# Patient Record
Sex: FEMALE | Race: WHITE | NOT HISPANIC OR LATINO | Employment: FULL TIME | URBAN - METROPOLITAN AREA
[De-identification: names, ages, dates, MRNs, and addresses within clinical notes are randomized per-mention and may not be internally consistent; named-entity substitution may affect disease eponyms.]

---

## 2017-01-03 ENCOUNTER — HOSPITAL ENCOUNTER (OUTPATIENT)
Dept: RADIOLOGY | Facility: MEDICAL CENTER | Age: 52
Discharge: HOME | End: 2017-01-03
Payer: COMMERCIAL

## 2017-01-03 DIAGNOSIS — Z12.31 OTHER SCREENING MAMMOGRAM: ICD-10-CM

## 2017-01-03 PROCEDURE — G0202 SCR MAMMO BI INCL CAD: HCPCS

## 2017-09-24 ENCOUNTER — OFFICE VISIT (OUTPATIENT)
Dept: URGENT CARE | Facility: MEDICAL CENTER | Age: 52
End: 2017-09-24
Payer: COMMERCIAL

## 2017-09-24 PROCEDURE — 99213 OFFICE O/P EST LOW 20 MIN: CPT

## 2017-12-28 ENCOUNTER — TRANSCRIBE ORDERS (OUTPATIENT)
Dept: ADMINISTRATIVE | Facility: HOSPITAL | Age: 52
End: 2017-12-28

## 2017-12-28 DIAGNOSIS — R92.2 DENSE BREASTS: ICD-10-CM

## 2017-12-28 DIAGNOSIS — Z12.31 VISIT FOR SCREENING MAMMOGRAM: Primary | ICD-10-CM

## 2019-02-22 ENCOUNTER — OFFICE VISIT (OUTPATIENT)
Dept: URGENT CARE | Facility: MEDICAL CENTER | Age: 54
End: 2019-02-22
Payer: COMMERCIAL

## 2019-02-22 VITALS
WEIGHT: 118.5 LBS | HEART RATE: 68 BPM | BODY MASS INDEX: 17.96 KG/M2 | DIASTOLIC BLOOD PRESSURE: 70 MMHG | SYSTOLIC BLOOD PRESSURE: 140 MMHG | HEIGHT: 68 IN | OXYGEN SATURATION: 100 % | TEMPERATURE: 98.6 F | RESPIRATION RATE: 16 BRPM

## 2019-02-22 DIAGNOSIS — F07.81 POST CONCUSSION SYNDROME: Primary | ICD-10-CM

## 2019-02-22 PROCEDURE — 99213 OFFICE O/P EST LOW 20 MIN: CPT | Performed by: FAMILY MEDICINE

## 2019-02-22 RX ORDER — MECLIZINE HCL 12.5 MG/1
12.5 TABLET ORAL EVERY 8 HOURS PRN
Qty: 20 TABLET | Refills: 0 | Status: SHIPPED | OUTPATIENT
Start: 2019-02-22

## 2019-02-22 NOTE — LETTER
February 22, 2019     Patient: Marce Gomez   YOB: 1965   Date of Visit: 2/22/2019       To Whom It May Concern:    Marce Gomez was evaluated here on 02/22/2019  Please excuse her absence from work  If you have any questions or concerns, please don't hesitate to call           Sincerely,        [address] [facility] [place]    CC: Marce Gomez

## 2019-02-22 NOTE — PROGRESS NOTES
[address] [facility]        NAME: Kenny Grady is a 48 y o  female  : 1965    MRN: [id number]  DATE: 2019  TIME: 8:41 AM    Assessment and Plan   Post concussion syndrome [F07 81]  1  Post concussion syndrome  meclizine (ANTIVERT) 12 5 MG tablet     Patient advised on a neurocognitive rest   Should avoid reading in looking aunt TV screens or computer screens  Meclizine prescribed as needed for vertigo  Patient Instructions     Follow up with PCP in 3-5 days  Proceed to  ER if symptoms worsen  Chief Complaint     Chief Complaint   Patient presents with    Fall     fall on wednesday hitting back of head on ground  saw "stars",did not loss consciousness  next day started with nausea  today nausea and dizziness  History of Present Illness     71-year-old female who presents today due to concussion like symptoms sustained from head trauma 3 days ago  Was walking down her driveway and slipped on ice falling backwards and hitting the back of her head  Denies any loss of consciousness but did see stars  Since then she reports having nausea, posterior head pain, mild blurriness of vision and vertigo  Denies any photophobia  Denies ever having concussion in the past   Does not take any medications that affect the central nervous system  Review of Systems   Review of Systems   Eyes: Positive for visual disturbance (mild blurriness)  Negative for photophobia  Gastrointestinal: Positive for nausea  Neurological: Positive for dizziness  Negative for headaches           Current Medications       Current Outpatient Medications:     meclizine (ANTIVERT) 12 5 MG tablet, Take 1 tablet (12 5 mg total) by mouth every 8 (eight) hours as needed for dizziness, Disp: 20 tablet, Rfl: 0    Current Allergies     Allergies as of 2019    (No Known Allergies)            The following portions of the patient's history were reviewed and updated as appropriate: allergies, current medications, past family history, past medical history, past social history, past surgical history and problem list      History reviewed  No pertinent past medical history  History reviewed  No pertinent surgical history  History reviewed  No pertinent family history  Medications have been verified  Objective   /70   Pulse 68   Temp 98 6 °F (37 °C) (Temporal)   Resp 16   Ht 5' 8" (1 727 m)   Wt 53 8 kg (118 lb 8 oz)   SpO2 100%   BMI 18 02 kg/m²        Physical Exam     Physical Exam   Constitutional: She is oriented to person, place, and time  She appears well-developed and well-nourished  No distress  HENT:   Head: Normocephalic and atraumatic  Eyes: Pupils are equal, round, and reactive to light  Conjunctivae are normal  Right eye exhibits no discharge  Left eye exhibits no discharge  Cardiovascular: Normal rate and regular rhythm  Pulmonary/Chest: Effort normal and breath sounds normal    Neurological: She is alert and oriented to person, place, and time  She displays abnormal reflex (patellar reflexes somewhat diminished, adequate bicep and tricep reflexes  )  No cranial nerve deficit  Coordination normal    Skin: Skin is warm  She is not diaphoretic  Psychiatric: She has a normal mood and affect  Her behavior is normal  Judgment and thought content normal    Vitals reviewed

## 2021-06-18 ENCOUNTER — APPOINTMENT (OUTPATIENT)
Dept: RADIOLOGY | Facility: MEDICAL CENTER | Age: 56
End: 2021-06-18
Payer: COMMERCIAL

## 2021-06-18 ENCOUNTER — OFFICE VISIT (OUTPATIENT)
Dept: URGENT CARE | Facility: MEDICAL CENTER | Age: 56
End: 2021-06-18
Payer: COMMERCIAL

## 2021-06-18 VITALS
HEIGHT: 68 IN | RESPIRATION RATE: 18 BRPM | WEIGHT: 118 LBS | HEART RATE: 60 BPM | OXYGEN SATURATION: 97 % | TEMPERATURE: 98.6 F | BODY MASS INDEX: 17.88 KG/M2

## 2021-06-18 DIAGNOSIS — M79.661 PAIN IN RIGHT LOWER LEG: Primary | ICD-10-CM

## 2021-06-18 DIAGNOSIS — M79.604 RIGHT LEG PAIN: ICD-10-CM

## 2021-06-18 PROCEDURE — 73590 X-RAY EXAM OF LOWER LEG: CPT

## 2021-06-18 PROCEDURE — 99213 OFFICE O/P EST LOW 20 MIN: CPT | Performed by: PHYSICIAN ASSISTANT

## 2021-06-18 NOTE — PROGRESS NOTES
[facility]        NAME: Marcus Sandoval is a 64 y o  female  : 1965    MRN: [id number]  DATE: 2021  TIME: 6:17 PM    Assessment and Plan   Pain in right lower leg [M79 [id number]]  1  Pain in right lower leg  XR tibia fibula 2 vw right     X-ray shows no acute abnormality  Likely due to overuse  Recommended rest, ice, compression and Tylenol or Motrin for pain  Patient Instructions     Tylenol or Motrin for pain   rest, ice, compression  Follow up with PCP in 3-5 days  Proceed to  ER if symptoms worsen  Chief Complaint     Chief Complaint   Patient presents with    Leg Pain     right lower leg pain x 2 days no injury noted          History of Present Illness         Patient is a 44-year-old female who comes in today with right shin pain for the past 2 days  Denies any injury  She is a  and does a lot of walking  There is no leg swelling or color change  Pain is mostly at night while she is asleep  No pain with weight-bearing  No numbness or tingling  Review of Systems   Review of Systems   Constitutional: Negative for fever  Musculoskeletal: Positive for myalgias  Negative for arthralgias and joint swelling  Skin: Negative for color change  Current Medications       Current Outpatient Medications:     meclizine (ANTIVERT) 12 5 MG tablet, Take 1 tablet (12 5 mg total) by mouth every 8 (eight) hours as needed for dizziness (Patient not taking: Reported on 2021), Disp: 20 tablet, Rfl: 0    Current Allergies     Allergies as of 2021    (No Known Allergies)            The following portions of the patient's history were reviewed and updated as appropriate: allergies, current medications, past family history, past medical history, past social history, past surgical history and problem list      History reviewed  No pertinent past medical history  History reviewed  No pertinent surgical history  History reviewed   No pertinent family history  Medications have been verified  Objective   Pulse 60   Temp 98 6 °F (37 °C)   Resp 18   Ht 5' 8" (1 727 m)   Wt 53 5 kg (118 lb)   SpO2 97%   BMI 17 94 kg/m²        Physical Exam     Physical Exam  Constitutional:       General: She is not in acute distress  Appearance: Normal appearance  She is not ill-appearing  Cardiovascular:      Rate and Rhythm: Normal rate and regular rhythm  Pulmonary:      Effort: Pulmonary effort is normal    Musculoskeletal:         General: Tenderness present  No swelling, deformity or signs of injury  Normal range of motion  Right lower leg: Tenderness and bony tenderness present  No swelling  No edema  Left lower leg: No edema  Legs:    Skin:     General: Skin is warm and dry  Findings: No bruising or erythema  Neurological:      Mental Status: She is alert

## 2024-02-18 ENCOUNTER — OFFICE VISIT (OUTPATIENT)
Dept: URGENT CARE | Facility: MEDICAL CENTER | Age: 59
End: 2024-02-18
Payer: COMMERCIAL

## 2024-02-18 VITALS
DIASTOLIC BLOOD PRESSURE: 86 MMHG | WEIGHT: 124 LBS | HEART RATE: 76 BPM | TEMPERATURE: 98 F | OXYGEN SATURATION: 99 % | SYSTOLIC BLOOD PRESSURE: 133 MMHG | BODY MASS INDEX: 19.46 KG/M2 | HEIGHT: 67 IN | RESPIRATION RATE: 18 BRPM

## 2024-02-18 DIAGNOSIS — R31.9 URINARY TRACT INFECTION WITH HEMATURIA, SITE UNSPECIFIED: Primary | ICD-10-CM

## 2024-02-18 DIAGNOSIS — N39.0 URINARY TRACT INFECTION WITH HEMATURIA, SITE UNSPECIFIED: Primary | ICD-10-CM

## 2024-02-18 LAB
SL AMB  POCT GLUCOSE, UA: ABNORMAL
SL AMB LEUKOCYTE ESTERASE,UA: ABNORMAL
SL AMB POCT BILIRUBIN,UA: ABNORMAL
SL AMB POCT BLOOD,UA: ABNORMAL
SL AMB POCT CLARITY,UA: ABNORMAL
SL AMB POCT COLOR,UA: YELLOW
SL AMB POCT KETONES,UA: ABNORMAL
SL AMB POCT NITRITE,UA: POSITIVE
SL AMB POCT PH,UA: 7.5
SL AMB POCT SPECIFIC GRAVITY,UA: 1
SL AMB POCT URINE PROTEIN: 0.2
SL AMB POCT UROBILINOGEN: 0

## 2024-02-18 PROCEDURE — 87077 CULTURE AEROBIC IDENTIFY: CPT | Performed by: PHYSICIAN ASSISTANT

## 2024-02-18 PROCEDURE — 87186 SC STD MICRODIL/AGAR DIL: CPT | Performed by: PHYSICIAN ASSISTANT

## 2024-02-18 PROCEDURE — 81002 URINALYSIS NONAUTO W/O SCOPE: CPT | Performed by: PHYSICIAN ASSISTANT

## 2024-02-18 PROCEDURE — 99213 OFFICE O/P EST LOW 20 MIN: CPT | Performed by: PHYSICIAN ASSISTANT

## 2024-02-18 PROCEDURE — 87086 URINE CULTURE/COLONY COUNT: CPT | Performed by: PHYSICIAN ASSISTANT

## 2024-02-18 RX ORDER — CALCIUM CARBONATE/VITAMIN D3 500MG-5MCG
TABLET ORAL
COMMUNITY

## 2024-02-18 RX ORDER — SULFAMETHOXAZOLE AND TRIMETHOPRIM 800; 160 MG/1; MG/1
1 TABLET ORAL EVERY 12 HOURS SCHEDULED
Qty: 14 TABLET | Refills: 0 | Status: SHIPPED | OUTPATIENT
Start: 2024-02-18 | End: 2024-02-25

## 2024-02-18 NOTE — PROGRESS NOTES
[facility]        NAME: Ginny Gilbert is a 58 y.o. female  : 1965    MRN: [id number]  DATE: 2024  TIME: 10:44 AM    Assessment and Plan   Urinary tract infection with hematuria, site unspecified [N39.0, R31.9]  1. Urinary tract infection with hematuria, site unspecified  Urine culture    POCT urine dip    Urine culture    sulfamethoxazole-trimethoprim (BACTRIM DS) 800-160 mg per tablet            Patient Instructions       Follow up with PCP in 3-5 days.  Proceed to  ER if symptoms worsen.    Chief Complaint     Chief Complaint   Patient presents with    Possible UTI     Pt. C/O urinary urgency and frequency for the past couple days          History of Present Illness       Patient is here for urinary frequency and pressure.  Her last UTI was a few years ago.        Review of Systems   Review of Systems   Constitutional: Negative.    Gastrointestinal: Negative.    Genitourinary:  Positive for dysuria, frequency and urgency. Negative for decreased urine volume, difficulty urinating, flank pain, hematuria, pelvic pain and vaginal pain.   Musculoskeletal: Negative.          Current Medications       Current Outpatient Medications:     Calcium Carb-Cholecalciferol (Calcium 500 + D) 500-5 MG-MCG TABS, Take by mouth, Disp: , Rfl:     sulfamethoxazole-trimethoprim (BACTRIM DS) 800-160 mg per tablet, Take 1 tablet by mouth every 12 (twelve) hours for 7 days, Disp: 14 tablet, Rfl: 0    meclizine (ANTIVERT) 12.5 MG tablet, Take 1 tablet (12.5 mg total) by mouth every 8 (eight) hours as needed for dizziness (Patient not taking: Reported on 2021), Disp: 20 tablet, Rfl: 0    Current Allergies     Allergies as of 2024    (No Known Allergies)            The following portions of the patient's history were reviewed and updated as appropriate: allergies, current medications, past family history, past medical history, past social history, past surgical history and problem list.     No past  "medical history on file.    No past surgical history on file.    No family history on file.      Medications have been verified.        Objective   /86   Pulse 76   Temp 98 °F (36.7 °C)   Resp 18   Ht 5' 7\" (1.702 m)   Wt 56.2 kg (124 lb)   SpO2 99%   BMI 19.42 kg/m²   No LMP recorded. Patient is postmenopausal.       Physical Exam     Physical Exam  Vitals and nursing note reviewed.   Constitutional:       General: She is not in acute distress.     Appearance: Normal appearance. She is well-developed. She is not ill-appearing, toxic-appearing or diaphoretic.   HENT:      Head: Normocephalic and atraumatic.   Abdominal:      General: Bowel sounds are normal. There is no distension.      Palpations: Abdomen is soft. There is no mass.      Tenderness: There is no abdominal tenderness. There is no right CVA tenderness, left CVA tenderness, guarding or rebound.   Skin:     General: Skin is warm and dry.      Findings: No rash.   Neurological:      Mental Status: She is alert and oriented to person, place, and time.   Psychiatric:         Behavior: Behavior normal.         Thought Content: Thought content normal.         Judgment: Judgment normal.                   "

## 2024-02-18 NOTE — PATIENT INSTRUCTIONS
1.  Drink plenty fluids.    2.  Take probiotics [i.e. Yogurt, Acidophilus, Florastor (liquid)] daily.    3.  Over-the-counter medications as needed for symptomatic care.    4.   Advance activities as tolerated.    5.   Follow-up with your primary care physician in 3-4 days.    6.  Go to emergency room if symptoms are worsening.

## 2024-02-20 LAB — BACTERIA UR CULT: ABNORMAL

## 2024-02-21 ENCOUNTER — TELEPHONE (OUTPATIENT)
Dept: URGENT CARE | Age: 59
End: 2024-02-21

## 2024-08-30 ENCOUNTER — OFFICE VISIT (OUTPATIENT)
Dept: URGENT CARE | Facility: MEDICAL CENTER | Age: 59
End: 2024-08-30
Payer: COMMERCIAL

## 2024-08-30 VITALS
TEMPERATURE: 98.6 F | HEART RATE: 58 BPM | RESPIRATION RATE: 20 BRPM | DIASTOLIC BLOOD PRESSURE: 75 MMHG | OXYGEN SATURATION: 100 % | SYSTOLIC BLOOD PRESSURE: 145 MMHG

## 2024-08-30 DIAGNOSIS — T63.484A ALLERGIC REACTION TO INSECT STING, UNDETERMINED INTENT, INITIAL ENCOUNTER: Primary | ICD-10-CM

## 2024-08-30 DIAGNOSIS — Z23 ENCOUNTER FOR IMMUNIZATION: ICD-10-CM

## 2024-08-30 DIAGNOSIS — S80.811A ABRASION OF RIGHT LOWER EXTREMITY, INITIAL ENCOUNTER: ICD-10-CM

## 2024-08-30 PROCEDURE — 90471 IMMUNIZATION ADMIN: CPT

## 2024-08-30 PROCEDURE — 99213 OFFICE O/P EST LOW 20 MIN: CPT

## 2024-08-30 PROCEDURE — 90715 TDAP VACCINE 7 YRS/> IM: CPT

## 2024-08-30 RX ORDER — TRIAMCINOLONE ACETONIDE 5 MG/G
CREAM TOPICAL 2 TIMES DAILY
Qty: 30 G | Refills: 0 | Status: SHIPPED | OUTPATIENT
Start: 2024-08-30

## 2024-08-30 NOTE — PROGRESS NOTES
[facility]        NAME: Ginny Gilbert is a 59 y.o. female  : 1965    MRN: [id number]  DATE: 2024  TIME: 11:34 AM    Assessment and Plan   Allergic reaction to insect sting, undetermined intent, initial encounter [T63.484A]  1. Allergic reaction to insect sting, undetermined intent, initial encounter  triamcinolone (KENALOG) 0.5 % cream      2. Abrasion of right lower extremity, initial encounter        3. Encounter for immunization  Tdap Vaccine greater than or equal to 8yo            Patient Instructions   Your TDap was updated today  The skin from the abrasion on your leg appears to be intact- you do not need to apply anything on it.  The area where you got stung has small amounts of swelling and itching - you can try low potency steroid cream like hydrocorisone 1% but if not effective after about 48 hours then can switch over to Kenalog cream that I had prescribed.    Follow up with Primary Care Provider in 3-5 days if not improving.  Proceed to Emergency Department if symptoms worsen.    If tests have been performed at [facility], our office will contact you with results if changes need to be made to the care plan discussed with you at the visit.  You can review your full results on [facility] My[facility].    Chief Complaint     Chief Complaint   Patient presents with    Immunizations     Need for tdap immunization; patient states she scrapped her right calf on dirty piece of metal off deck     Wound Check    Bee Sting     Patient was stung by yellow jacket yesterday afternoon          History of Present Illness       Was stung by a yellow jacket yesterday afternoon in the left calf and it has been itchy and bothering her sleep - has not applied any OTC medication or other interventions.  Has scrapped her right calf to a dirty metal part of her deck about 1 hour prior to arrival, she was checking her records and noticed her Tdap was not up to date and would like to be updated.        Review  of Systems   Review of Systems   Respiratory:  Negative for shortness of breath.    Cardiovascular:  Negative for chest pain.   Skin:  Positive for rash and wound.   Neurological:  Negative for dizziness, light-headedness and numbness.         Current Medications       Current Outpatient Medications:     triamcinolone (KENALOG) 0.5 % cream, Apply topically 2 (two) times a day To right calf where localized swelling is occurring., Disp: 30 g, Rfl: 0    Calcium Carb-Cholecalciferol (Calcium 500 + D) 500-5 MG-MCG TABS, Take by mouth, Disp: , Rfl:     meclizine (ANTIVERT) 12.5 MG tablet, Take 1 tablet (12.5 mg total) by mouth every 8 (eight) hours as needed for dizziness (Patient not taking: Reported on 6/18/2021), Disp: 20 tablet, Rfl: 0    Current Allergies     Allergies as of 08/30/2024    (No Known Allergies)            The following portions of the patient's history were reviewed and updated as appropriate: allergies, current medications, past family history, past medical history, past social history, past surgical history and problem list.     History reviewed. No pertinent past medical history.    History reviewed. No pertinent surgical history.    History reviewed. No pertinent family history.      Medications have been verified.        Objective   /75   Pulse 58   Temp 98.6 °F (37 °C) (Temporal)   Resp 20   SpO2 100%   No LMP recorded. Patient is postmenopausal.       Physical Exam     Physical Exam  Vitals and nursing note reviewed.   Constitutional:       Appearance: Normal appearance.   HENT:      Head: Normocephalic and atraumatic.   Pulmonary:      Effort: Pulmonary effort is normal.   Skin:     General: Skin is warm and dry.      Capillary Refill: Capillary refill takes less than 2 seconds.          Neurological:      General: No focal deficit present.      Mental Status: She is alert and oriented to person, place, and time. Mental status is at baseline.      Sensory: No sensory deficit.       Motor: No weakness.   Psychiatric:         Mood and Affect: Mood normal.         Behavior: Behavior normal.         Thought Content: Thought content normal.

## 2024-08-30 NOTE — PATIENT INSTRUCTIONS
Your TDap was updated today  The skin from the abrasion on your leg appears to be intact- you do not need to apply anything on it.  The area where you got stung has small amounts of swelling and itching - you can try low potency steroid cream like hydrocorisone 1% but if not effective after about 48 hours then can switch over to Kenalog cream that I had prescribed.    Follow up with Primary Care Provider in 3-5 days if not improving.  Proceed to Emergency Department if symptoms worsen.    If tests have been performed at [facility], our office will contact you with results if changes need to be made to the care plan discussed with you at the visit.  You can review your full results on [facility] My[facility].

## 2024-12-20 ENCOUNTER — APPOINTMENT (OUTPATIENT)
Dept: RADIOLOGY | Facility: MEDICAL CENTER | Age: 59
End: 2024-12-20
Payer: OTHER MISCELLANEOUS

## 2024-12-20 ENCOUNTER — OCCMED (OUTPATIENT)
Dept: URGENT CARE | Facility: MEDICAL CENTER | Age: 59
End: 2024-12-20
Payer: OTHER MISCELLANEOUS

## 2024-12-20 DIAGNOSIS — S99.922A INJURY OF LEFT ANKLE AND FOOT, INITIAL ENCOUNTER: ICD-10-CM

## 2024-12-20 DIAGNOSIS — S99.912A INJURY OF LEFT ANKLE, INITIAL ENCOUNTER: ICD-10-CM

## 2024-12-20 DIAGNOSIS — S99.912A INJURY OF LEFT ANKLE AND FOOT, INITIAL ENCOUNTER: ICD-10-CM

## 2024-12-20 DIAGNOSIS — S99.912A INJURY OF LEFT ANKLE, INITIAL ENCOUNTER: Primary | ICD-10-CM

## 2024-12-20 PROCEDURE — 73610 X-RAY EXAM OF ANKLE: CPT

## 2024-12-21 NOTE — PROGRESS NOTES
This encounter was created for [facility] orders only .   [facility]        NAME: Ginny Gilbert is a 59 y.o. female  : 1965    MRN: [id number]  DATE: 2024  TIME: 8:06 PM    Assessment and Plan   Injury of left ankle, initial encounter [S99.912A]  1. Injury of left ankle, initial encounter  XR ankle 3+ vw left    XR ankle 3+ vw left            Patient Instructions       Follow up with PCP in 3-5 days.  Proceed to  ER if symptoms worsen.    If tests have been performed at [facility], our office will contact you with results if changes need to be made to the care plan discussed with you at the visit.  You can review your full results on [facility].    Chief Complaint   No chief complaint on file.        History of Present Illness       HPI    Review of Systems   Review of Systems      Current Medications       Current Outpatient Medications:   •  Calcium Carb-Cholecalciferol (Calcium 500 + D) 500-5 MG-MCG TABS, Take by mouth, Disp: , Rfl:   •  meclizine (ANTIVERT) 12.5 MG tablet, Take 1 tablet (12.5 mg total) by mouth every 8 (eight) hours as needed for dizziness (Patient not taking: Reported on 2021), Disp: 20 tablet, Rfl: 0  •  triamcinolone (KENALOG) 0.5 % cream, Apply topically 2 (two) times a day To right calf where localized swelling is occurring., Disp: 30 g, Rfl: 0    Current Allergies     Allergies as of 2024   • (No Known Allergies)            The following portions of the patient's history were reviewed and updated as appropriate: allergies, current medications, past family history, past medical history, past social history, past surgical history and problem list.     No past medical history on file.    No past surgical history on file.    No family history on file.      Medications have been verified.        Objective   There were no vitals taken for this visit.  No LMP recorded. Patient is postmenopausal.       Physical Exam     Physical Exam

## 2024-12-26 ENCOUNTER — APPOINTMENT (OUTPATIENT)
Dept: URGENT CARE | Facility: MEDICAL CENTER | Age: 59
End: 2024-12-26
Payer: OTHER MISCELLANEOUS

## 2024-12-26 PROCEDURE — 99213 OFFICE O/P EST LOW 20 MIN: CPT | Performed by: PHYSICIAN ASSISTANT

## 2025-02-23 ENCOUNTER — OFFICE VISIT (OUTPATIENT)
Dept: URGENT CARE | Facility: MEDICAL CENTER | Age: 60
End: 2025-02-23
Payer: COMMERCIAL

## 2025-02-23 VITALS
HEIGHT: 67 IN | HEART RATE: 67 BPM | OXYGEN SATURATION: 99 % | RESPIRATION RATE: 18 BRPM | SYSTOLIC BLOOD PRESSURE: 119 MMHG | WEIGHT: 120 LBS | TEMPERATURE: 97.8 F | DIASTOLIC BLOOD PRESSURE: 79 MMHG | BODY MASS INDEX: 18.83 KG/M2

## 2025-02-23 DIAGNOSIS — N39.0 URINARY TRACT INFECTION WITH HEMATURIA, SITE UNSPECIFIED: Primary | ICD-10-CM

## 2025-02-23 DIAGNOSIS — R30.0 DYSURIA: ICD-10-CM

## 2025-02-23 DIAGNOSIS — R31.9 URINARY TRACT INFECTION WITH HEMATURIA, SITE UNSPECIFIED: Primary | ICD-10-CM

## 2025-02-23 LAB
SL AMB  POCT GLUCOSE, UA: ABNORMAL
SL AMB LEUKOCYTE ESTERASE,UA: ABNORMAL
SL AMB POCT BILIRUBIN,UA: ABNORMAL
SL AMB POCT BLOOD,UA: ABNORMAL
SL AMB POCT CLARITY,UA: CLEAR
SL AMB POCT COLOR,UA: YELLOW
SL AMB POCT KETONES,UA: ABNORMAL
SL AMB POCT NITRITE,UA: ABNORMAL
SL AMB POCT PH,UA: 7
SL AMB POCT SPECIFIC GRAVITY,UA: 1
SL AMB POCT URINE PROTEIN: ABNORMAL
SL AMB POCT UROBILINOGEN: 0.2

## 2025-02-23 PROCEDURE — 99214 OFFICE O/P EST MOD 30 MIN: CPT | Performed by: PHYSICIAN ASSISTANT

## 2025-02-23 PROCEDURE — 87086 URINE CULTURE/COLONY COUNT: CPT | Performed by: PHYSICIAN ASSISTANT

## 2025-02-23 PROCEDURE — 87077 CULTURE AEROBIC IDENTIFY: CPT | Performed by: PHYSICIAN ASSISTANT

## 2025-02-23 PROCEDURE — 81002 URINALYSIS NONAUTO W/O SCOPE: CPT | Performed by: PHYSICIAN ASSISTANT

## 2025-02-23 RX ORDER — SULFAMETHOXAZOLE AND TRIMETHOPRIM 800; 160 MG/1; MG/1
1 TABLET ORAL EVERY 12 HOURS SCHEDULED
Qty: 6 TABLET | Refills: 0 | Status: SHIPPED | OUTPATIENT
Start: 2025-02-23 | End: 2025-02-26

## 2025-02-23 NOTE — PATIENT INSTRUCTIONS
Please take and finish the course of antibiotics given unless otherwise directed   Your symptoms should improve within 24-48 hours of antibiotic use  A urine culture will be sent out -- if any changes to the treatment plan need to be made, our office will give you a call to let you know  Other measures to help with treatment   Drink plenty of fluids to flush out the urinary system   Use the restroom   Preventing UTI's   Females - wiping from front to back after using the restroom to prevent cross contamination of fecal bacteria into the urinary tract   Females - urinating within 5-10 minutes after sexual intercourse   Females and males - taking a cranberry supplement, probiotics, vitamin C, and/or D-mannose may help in certain cases   When to seek further medical attention   Symptoms continue beyond 24-48 hours of taking antibiotics   You develop fevers, chills, low back pain, or any other severe symptoms after starting antibiotics   You have any adverse reaction to the antibiotic prescribed

## 2025-02-23 NOTE — PROGRESS NOTES
[facility]        NAME: Ginny Gilbert is a 59 y.o. female  : 1965    MRN: [id number]  DATE: 2025  TIME: 12:34 PM      Assessment and Plan     Urinary tract infection with hematuria, site unspecified [N39.0, R31.9]  1. Urinary tract infection with hematuria, site unspecified  sulfamethoxazole-trimethoprim (BACTRIM DS) 800-160 mg per tablet      2. Dysuria  Urine culture    POCT urine dip          POC Testing Results        Note:       Patient Instructions     Patient Instructions     Please take and finish the course of antibiotics given unless otherwise directed   Your symptoms should improve within 24-48 hours of antibiotic use  A urine culture will be sent out -- if any changes to the treatment plan need to be made, our office will give you a call to let you know  Other measures to help with treatment   Drink plenty of fluids to flush out the urinary system   Use the restroom   Preventing UTI's   Females - wiping from front to back after using the restroom to prevent cross contamination of fecal bacteria into the urinary tract   Females - urinating within 5-10 minutes after sexual intercourse   Females and males - taking a cranberry supplement, probiotics, vitamin C, and/or D-mannose may help in certain cases   When to seek further medical attention   Symptoms continue beyond 24-48 hours of taking antibiotics   You develop fevers, chills, low back pain, or any other severe symptoms after starting antibiotics   You have any adverse reaction to the antibiotic prescribed        Follow up with primary care provider.   Go to ER if symptoms worsen.    Chief Complaint     Chief Complaint   Patient presents with    Possible UTI     Pt reports with urinary urgency, feeling of not emptying bladder, mild discomfort started two days ago.          History of Present Illness     Pt reports urinary frequency and urgency x 2 days. She denies hematuria, dysuria, fever, abdominal pain, n/v/d. She  "reports UTI about 1 year ago.         Review of Systems     Review of Systems   Constitutional:  Negative for fever.   Gastrointestinal:  Negative for abdominal pain, diarrhea, nausea and vomiting.   Genitourinary:  Positive for frequency. Negative for dysuria, flank pain and hematuria.   Neurological:  Negative for syncope.         Current Medications       Current Outpatient Medications:     Calcium Carb-Cholecalciferol (Calcium 500 + D) 500-5 MG-MCG TABS, Take by mouth, Disp: , Rfl:     sulfamethoxazole-trimethoprim (BACTRIM DS) 800-160 mg per tablet, Take 1 tablet by mouth every 12 (twelve) hours for 3 days, Disp: 6 tablet, Rfl: 0    meclizine (ANTIVERT) 12.5 MG tablet, Take 1 tablet (12.5 mg total) by mouth every 8 (eight) hours as needed for dizziness (Patient not taking: Reported on 2/23/2025), Disp: 20 tablet, Rfl: 0    triamcinolone (KENALOG) 0.5 % cream, Apply topically 2 (two) times a day To right calf where localized swelling is occurring. (Patient not taking: Reported on 2/23/2025), Disp: 30 g, Rfl: 0    Current Allergies     Allergies as of 02/23/2025    (No Known Allergies)              The following portions of the patient's history were reviewed and updated as appropriate: allergies, current medications, past family history, past medical history, past social history, past surgical history, and problem list.     History reviewed. No pertinent past medical history.    History reviewed. No pertinent surgical history.    History reviewed. No pertinent family history.      Medications have been verified.        Objective     /79   Pulse 67   Temp 97.8 °F (36.6 °C) (Temporal)   Resp 18   Ht 5' 7\" (1.702 m)   Wt 54.4 kg (120 lb)   SpO2 99%   BMI 18.79 kg/m²   No LMP recorded. Patient is postmenopausal.         Physical Exam     Physical Exam  Constitutional:       General: She is not in acute distress.     Appearance: Normal appearance. She is not ill-appearing, toxic-appearing or diaphoretic. "   HENT:      Head: Normocephalic and atraumatic.   Eyes:      General: No scleral icterus.        Right eye: No discharge.         Left eye: No discharge.      Extraocular Movements: Extraocular movements intact.      Conjunctiva/sclera: Conjunctivae normal.      Pupils: Pupils are equal, round, and reactive to light.   Cardiovascular:      Rate and Rhythm: Normal rate and regular rhythm.      Heart sounds: Normal heart sounds. No murmur heard.     No friction rub. No gallop.   Pulmonary:      Effort: Pulmonary effort is normal. No respiratory distress.      Breath sounds: Normal breath sounds. No stridor. No wheezing, rhonchi or rales.   Abdominal:      General: Abdomen is flat. Bowel sounds are normal.      Palpations: Abdomen is soft. There is no shifting dullness, fluid wave, hepatomegaly, splenomegaly, mass or pulsatile mass.      Tenderness: There is no abdominal tenderness. There is no right CVA tenderness, left CVA tenderness, guarding or rebound.   Musculoskeletal:      Cervical back: Normal range of motion and neck supple. No rigidity.   Skin:     General: Skin is warm and dry.      Coloration: Skin is not jaundiced or pale.      Findings: No bruising, erythema, lesion or rash.   Neurological:      General: No focal deficit present.      Mental Status: She is alert and oriented to person, place, and time. Mental status is at baseline.   Psychiatric:         Mood and Affect: Mood normal.         Behavior: Behavior normal.         Thought Content: Thought content normal.         Judgment: Judgment normal.

## 2025-02-24 LAB
BACTERIA UR CULT: ABNORMAL
BACTERIA UR CULT: ABNORMAL